# Patient Record
Sex: FEMALE | Race: BLACK OR AFRICAN AMERICAN | NOT HISPANIC OR LATINO
[De-identification: names, ages, dates, MRNs, and addresses within clinical notes are randomized per-mention and may not be internally consistent; named-entity substitution may affect disease eponyms.]

---

## 2020-03-24 ENCOUNTER — APPOINTMENT (OUTPATIENT)
Dept: BREAST CENTER | Facility: CLINIC | Age: 40
End: 2020-03-24
Payer: MEDICARE

## 2020-03-24 DIAGNOSIS — Z86.79 PERSONAL HISTORY OF OTHER DISEASES OF THE CIRCULATORY SYSTEM: ICD-10-CM

## 2020-03-24 DIAGNOSIS — Z87.39 PERSONAL HISTORY OF OTHER DISEASES OF THE MUSCULOSKELETAL SYSTEM AND CONNECTIVE TISSUE: ICD-10-CM

## 2020-03-24 DIAGNOSIS — Z78.9 OTHER SPECIFIED HEALTH STATUS: ICD-10-CM

## 2020-03-24 DIAGNOSIS — Z87.891 PERSONAL HISTORY OF NICOTINE DEPENDENCE: ICD-10-CM

## 2020-03-24 DIAGNOSIS — Z12.39 ENCOUNTER FOR OTHER SCREENING FOR MALIGNANT NEOPLASM OF BREAST: ICD-10-CM

## 2020-03-24 DIAGNOSIS — Z82.49 FAMILY HISTORY OF ISCHEMIC HEART DISEASE AND OTHER DISEASES OF THE CIRCULATORY SYSTEM: ICD-10-CM

## 2020-03-24 DIAGNOSIS — Z80.9 FAMILY HISTORY OF MALIGNANT NEOPLASM, UNSPECIFIED: ICD-10-CM

## 2020-03-24 DIAGNOSIS — Z83.511 FAMILY HISTORY OF GLAUCOMA: ICD-10-CM

## 2020-03-24 DIAGNOSIS — L29.9 PRURITUS, UNSPECIFIED: ICD-10-CM

## 2020-03-24 DIAGNOSIS — E72.12 METHYLENETETRAHYDROFOLATE REDUCTASE DEFICIENCY: ICD-10-CM

## 2020-03-24 PROBLEM — Z00.00 ENCOUNTER FOR PREVENTIVE HEALTH EXAMINATION: Status: ACTIVE | Noted: 2020-03-24

## 2020-03-24 PROCEDURE — 99204 OFFICE O/P NEW MOD 45 MIN: CPT

## 2020-03-24 RX ORDER — LABETALOL HYDROCHLORIDE 300 MG/1
TABLET, FILM COATED ORAL
Refills: 0 | Status: ACTIVE | COMMUNITY

## 2020-03-24 NOTE — HISTORY OF PRESENT ILLNESS
[FreeTextEntry1] : This is a 40 yr old female referred by Dr. Shine for discoloration of her breasts.  The patient states that she is s/p bilateral breast reduction 2014 by Dr. Tran.  She said she has some concerns since her reduction as she has been watching more "Botched" episodes. She is worried there is scar tissue that is affecting the "oxygenation of her tissues". She states her breast was very itchy when she was pregnant November-December 2019. She had a miscarriage at 6 weeks in December. She states the itchiness has subsided some but is not completely better. Denies any trauma, changes to herbs, diet or supplements.\par \par She does SBE. \par She has not noticed a change in her breast or a breast lump.\par She has not noticed a change in her nipple or nipple area.\par She has not noticed a change in the skin of the breast.\par She is not experiencing nipple discharge.\par She is not experiencing breast pain.\par She has not noticed a lump or lymph node under the armpit. \par \par BREAST CANCER RISK FACTORS\par Menarche: 11\par Date of LMP: Sept\par Menopause: pre\par Grav:   4   Para:  0\par Age at first live birth: n/a\par Nursed: n/a\par Hysterectomy: no\par Oophorectomy: no\par OCP: yes 2y\par HRT: no\par Last pap/pelvic exam: Jan WNL\par Related family history: no\par Ashkenazi: no\par Mastery risk assessment: BRCAPRO 11.1%, TCv6 11.1%, TCv7 14.6%, Dasha 9.6%\par BRCA testing: no\par Bra size: 40 DDD\par \par Last mammogram:  1/31/2020 right only                             Location: [facility], [facility]\par Report reviewed.                                 Images reviewed on PACS\par Results: are of possible architectural distortion right breast, rec 6 month right mammo\par \par Last ultrasound:  1/31/2020 right only                                       Location: [facility], [facility]\par Report reviewed.                                 Images reviewed on PACS\par Results: No solid or cystic masses are seen in area of interest.\par \par Last MRI: never                                             Location:\par Report reviewed.\par

## 2020-03-24 NOTE — CONSULT LETTER
[Dear  ___] : Dear ~ROBBIE, [( Thank you for referring [unfilled] for consultation for _____ )] : Thank you for referring [unfilled] for consultation for [unfilled] [Please see my note below.] : Please see my note below. [Consult Closing:] : Thank you very much for allowing me to participate in the care of this patient.  If you have any questions, please do not hesitate to contact me. [Sincerely,] : Sincerely, [FreeTextEntry3] : Deb Sloan MS DO\par Breast Surgeon\par [facility] [place] \[place] [address]\par

## 2020-03-24 NOTE — PHYSICAL EXAM
[Normocephalic] : normocephalic [Atraumatic] : atraumatic [Supple] : supple [No Supraclavicular Adenopathy] : no supraclavicular adenopathy [Examined in the supine and seated position] : examined in the supine and seated position [Symmetrical] : symmetrical [Grade 3] : Ptosis Grade 3 [No dominant masses] : no dominant masses in right breast  [No dominant masses] : no dominant masses left breast [No Nipple Retraction] : no left nipple retraction [No Nipple Discharge] : no left nipple discharge [No Axillary Lymphadenopathy] : no left axillary lymphadenopathy [No Edema] : no edema [No Rashes] : no rashes [No Ulceration] : no ulceration [de-identified] : s/p reduction wise pattern [de-identified] : in area of patient's concern, there is redness, no peau d'orange, no collections, no suspicious findings, likely eczematoid change

## 2020-03-24 NOTE — ASSESSMENT
[FreeTextEntry1] : 39 yo female with right breast imaging abnormality and itchy skin\par recommend derm cs asap, gave her names of local options\par reassured her that her breasts are viable and cosmetic outcome is excellent\par will have her imaging reviewed at [facility] biopsy vs July right mammogram\par I will call her after imaging reviewed\par reviewed her risk status she is not high risk\par  We reviewed risk reduction strategies including maintaining a BMI <25, limiting red meat intake and alcoholic beverages to 3 per week and exercise (150 min/ week low intensity or 75 min/week high intensity). And maintaining a normal vitamin D level.\par reviewed skin care, cotton bras, non-scented lotions and soaps, recommend moisturizing\par I reassured her that her skin changes are not suspicious for cancer\par plan right mammogram JULY 2020 and follow up with me afterward\par unless otherwise recommended by radiology\par She knows to call or return sooner should any concerns or questions arise.\par \par

## 2020-03-27 ENCOUNTER — APPOINTMENT (OUTPATIENT)
Dept: BREAST CENTER | Facility: CLINIC | Age: 40
End: 2020-03-27
Payer: MEDICARE

## 2020-03-27 DIAGNOSIS — R92.2 INCONCLUSIVE MAMMOGRAM: ICD-10-CM

## 2020-03-27 DIAGNOSIS — R92.8 OTHER ABNORMAL AND INCONCLUSIVE FINDINGS ON DIAGNOSTIC IMAGING OF BREAST: ICD-10-CM

## 2020-03-27 PROCEDURE — 99446 NTRPROF PH1/NTRNET/EHR 5-10: CPT

## 2020-05-29 PROBLEM — R92.2 DENSE BREAST: Status: ACTIVE | Noted: 2020-05-21

## 2020-05-29 PROBLEM — R92.8 ABNORMAL FINDING ON BREAST IMAGING: Status: ACTIVE | Noted: 2020-03-24

## 2021-01-14 ENCOUNTER — APPOINTMENT (OUTPATIENT)
Dept: POPULATION HEALTH | Facility: CLINIC | Age: 41
End: 2021-01-14
Payer: MEDICARE

## 2021-01-14 DIAGNOSIS — N97.9 FEMALE INFERTILITY, UNSPECIFIED: ICD-10-CM

## 2021-01-14 DIAGNOSIS — Z77.098 CONTACT WITH AND (SUSPECTED) EXPOSURE TO OTHER HAZARDOUS, CHIEFLY NONMEDICINAL, CHEMICALS: ICD-10-CM

## 2021-01-14 PROCEDURE — 99204 OFFICE O/P NEW MOD 45 MIN: CPT | Mod: 95

## 2021-01-14 RX ORDER — ALPRAZOLAM 2 MG/1
TABLET ORAL
Refills: 0 | Status: ACTIVE | COMMUNITY

## 2021-01-14 RX ORDER — OXYCODONE HYDROCHLORIDE 30 MG/1
TABLET ORAL
Refills: 0 | Status: ACTIVE | COMMUNITY

## 2021-01-14 NOTE — HISTORY OF PRESENT ILLNESS
[FreeTextEntry1] : 39 yo woman here for evaluation of exposure to PFAS chemicals in [facility].\par \par born in [place], lived there until 11\par lived in a house in the [place], drank tap.  went to school in [place].\par \par Moved to [place] [facility] from age 11 - 27 or 28\par for HS went to boarding school in [place]\par \par then went to [employer] in [facility]\par \par At 27 or 28 moved to [place].\par lived in 2 different homes in [place] [facility] a block apart.  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  office was in [place] and then [place].\par then she got injured at work and has been on disability since 2014.\par \par Hobbies:\par walks dog\par watch tv\par \par PMHx:\par \par secondary infertility:\par has had 3-4 pregnancies prior to moving to [place]; all were terminated electively\par after moving to [place] attempted pregnancy starting in ; \par went for evaluation and was diagnosed with fibroids; they were removed in  and .  \par had one pregnancy this year and miscarried at 6 weeks. \par \par depression\par anxiety\par \par thyroid cysts bur normal thyroid hormones.  \par \par \par \par FHx:\par father - healthy\par mother - healthy\par brother - drug abuse, \par \par \par \par

## 2021-01-14 NOTE — REASON FOR VISIT
[Home] : at home, [unfilled] , at the time of the visit. [Medical Office: ([facility])___] : at the medical office located in  [Verbal consent obtained from patient] : the patient, [unfilled]

## 2021-03-02 ENCOUNTER — APPOINTMENT (OUTPATIENT)
Dept: PLASTIC SURGERY | Facility: CLINIC | Age: 41
End: 2021-03-02
Payer: MEDICARE

## 2021-03-02 VITALS
DIASTOLIC BLOOD PRESSURE: 73 MMHG | OXYGEN SATURATION: 100 % | HEIGHT: 64 IN | BODY MASS INDEX: 35 KG/M2 | TEMPERATURE: 97.9 F | HEART RATE: 125 BPM | RESPIRATION RATE: 20 BRPM | SYSTOLIC BLOOD PRESSURE: 101 MMHG | WEIGHT: 205 LBS

## 2021-03-02 DIAGNOSIS — N62 HYPERTROPHY OF BREAST: ICD-10-CM

## 2021-03-02 DIAGNOSIS — Z86.59 PERSONAL HISTORY OF OTHER MENTAL AND BEHAVIORAL DISORDERS: ICD-10-CM

## 2021-03-02 DIAGNOSIS — Z87.2 PERSONAL HISTORY OF DISEASES OF THE SKIN AND SUBCUTANEOUS TISSUE: ICD-10-CM

## 2021-03-02 PROCEDURE — 99204 OFFICE O/P NEW MOD 45 MIN: CPT

## 2021-03-02 NOTE — ASSESSMENT
[FreeTextEntry1] : pt has MTHFR mutation and is on folate supplements .  she is going to try to lose weight and she has stopped nicotine products.  she will rto in the fall to discuss secondary  breast reduction and abdominoplasty surgery

## 2021-03-02 NOTE — HISTORY OF PRESENT ILLNESS
[FreeTextEntry1] :  Ms. KARTHIK ROE  is a   41 year  old female complaining of large recurrent  hypertrophic breasts after inferior pedicle breast reduction 2014, causing neck, back and shoulder strain.  She also complains of headache and fatigue from constantly trying to support her spine.  Her symptoms began shortly after puberty and remain a constant source  of discomfort and social embarrassment. Her symptoms are not relieved by postural changes weight reduction or supportive measures.  She has been under the care of other practitioners for relief which is ineffectual. The patient is a DDD cup and desires reduction to C_D cup.   The risks, benefits, alternatives limitations and the permanent scars were outlined with the patient and she is prepared for a pedicled  breast reduction with anchor    scar.\par Ms. KARTHIK ROE is a 41 year Other race woman complaining of skin and muscle laxity in the abdomen and lipodystrophy.  She  is Gravida3   Pregnancy 0   Abortion2 mis1    and is not planning any further children.  KARTHIK has gained significant weight  and now has residual pannus deformity and diastasis rectus. she also has intertrigo and has had open wounds on the crease below her pannus  The risks, benefits, alternatives, limitations, and the permanent scars were outlined with the patient and we have determined that abdominoplasty and diastasis rectus repair will be the best option for her .  All questions were answered.\par

## 2021-03-02 NOTE — REVIEW OF SYSTEMS
[Shoulder Problem] : shoulder problems [Lower Back Pain] : lower back pain [Mid Back Pain] : mid back pain [Upper Back Pain] : upper back pain [FreeTextEntry5] : htn

## 2021-07-01 ENCOUNTER — APPOINTMENT (OUTPATIENT)
Dept: PLASTIC SURGERY | Facility: CLINIC | Age: 41
End: 2021-07-01
Payer: MEDICARE

## 2021-07-01 DIAGNOSIS — M50.222 OTHER CERVICAL DISC DISPLACEMENT AT C5-C6 LEVEL: ICD-10-CM

## 2021-07-01 DIAGNOSIS — E88.1 LIPODYSTROPHY, NOT ELSEWHERE CLASSIFIED: ICD-10-CM

## 2021-07-01 PROCEDURE — 99213 OFFICE O/P EST LOW 20 MIN: CPT

## 2021-07-14 PROBLEM — E88.1 LIPODYSTROPHY: Status: ACTIVE | Noted: 2021-07-14

## 2021-07-14 NOTE — ASSESSMENT
[FreeTextEntry1] : pt is high risk for surgery given multiple medial issues and she needs to lose 30- 50 lbs prior to surgery to make herself a better candidate

## 2021-07-14 NOTE — HISTORY OF PRESENT ILLNESS
[FreeTextEntry1] : pt is here for second chat  she was here for abdominoplasty but was advised to lose weight first  she has =been unable to lose weight and desires to proceed.  pt is complicated and is not following a regimen that will lead to a successful outcome in addition she has mthfr which has higher risk of dvt pe and delayed healing . she desires surgery to remove hypertrophic scarring under her breasts and beneath her pannnus .  these are chronic infection sites from secondary healing or rashes

## 2021-07-14 NOTE — PHYSICAL EXAM
[NI] : Normal [de-identified] : s/p breast reduction no mass scar under breasts mildly hypertrophic no mass  [de-identified] : obese and pannus hangs over escutcheon  [de-identified] : scars as described along skin creases from chronic irritation

## 2022-06-21 ENCOUNTER — APPOINTMENT (OUTPATIENT)
Dept: PLASTIC SURGERY | Facility: CLINIC | Age: 42
End: 2022-06-21

## 2022-06-21 ENCOUNTER — APPOINTMENT (OUTPATIENT)
Dept: PLASTIC SURGERY | Facility: CLINIC | Age: 42
End: 2022-06-21
Payer: MEDICARE

## 2022-06-21 VITALS
BODY MASS INDEX: 32.61 KG/M2 | OXYGEN SATURATION: 96 % | HEART RATE: 101 BPM | WEIGHT: 190 LBS | DIASTOLIC BLOOD PRESSURE: 88 MMHG | SYSTOLIC BLOOD PRESSURE: 140 MMHG | RESPIRATION RATE: 20 BRPM

## 2022-06-21 PROCEDURE — 99213 OFFICE O/P EST LOW 20 MIN: CPT

## 2022-06-21 RX ORDER — AMLODIPINE BESYLATE 5 MG/1
5 TABLET ORAL
Qty: 90 | Refills: 0 | Status: ACTIVE | COMMUNITY
Start: 2022-04-13

## 2022-06-21 RX ORDER — ALPRAZOLAM 0.5 MG/1
0.5 TABLET ORAL
Qty: 60 | Refills: 0 | Status: ACTIVE | COMMUNITY
Start: 2022-05-31

## 2022-06-21 RX ORDER — TRAZODONE HYDROCHLORIDE 100 MG/1
100 TABLET ORAL
Qty: 30 | Refills: 0 | Status: ACTIVE | COMMUNITY
Start: 2022-05-31

## 2022-06-21 RX ORDER — SERTRALINE HYDROCHLORIDE 50 MG/1
50 TABLET, FILM COATED ORAL
Qty: 30 | Refills: 0 | Status: ACTIVE | COMMUNITY
Start: 2022-06-17

## 2022-06-21 RX ORDER — NYSTATIN AND TRIAMCINOLONE ACETONIDE 100000; 1 MG/G; MG/G
100000-0.1 CREAM TOPICAL
Qty: 60 | Refills: 0 | Status: ACTIVE | COMMUNITY
Start: 2022-01-12

## 2022-06-21 RX ORDER — HYDROCHLOROTHIAZIDE 25 MG/1
25 TABLET ORAL
Qty: 90 | Refills: 0 | Status: ACTIVE | COMMUNITY
Start: 2022-06-02

## 2022-06-21 NOTE — HISTORY OF PRESENT ILLNESS
[FreeTextEntry1] : pt has lost 25-30 lbs and has hit a plateau but she feels better physically and also brain fog is improved.  pt really needs to have insurance coverage for abdominoplasty because she has chronic dermatitis and rashes with intertrigo under the pannus The risks, benefits, alternatives, limitations and the permanent scars were outlined with the patient. All of KARTHIK 's questions and concerns were addressed and answered completely. KARTHIK is advised to lose more weight if possible to make her a better candidate for surgery  will need anticoagulation as she has mthfr gene mutation

## 2022-06-21 NOTE — ASSESSMENT
[FreeTextEntry1] : Ms. KARTHIK ROE is a 42 year Other race woman who has consulted with me regarding improving the contour of her  her abdomen to reduce the incidence of intertriginous rashes . KARTHIK  is a candidate for abdominoplasty and diastasis rectus repair.  The permanent scar along the lower abdomen from hip to hip and around the umbilicus, was demonstrated and explained.   There is no hernia present and some liposuction may be required along the lateral hip area to further improve the contour.  Drains will be used and then removed in one week.  The risks, benefits, alternatives, limitations, and the permanent scars were outlined with the patient and She will consider scheduling surgery under general anesthesia at a convenient time.  All questions were answered.

## 2022-06-21 NOTE — PHYSICAL EXAM
[NI] : Normal [de-identified] : s/p breast reduction no mass scar under breasts mildly hypertrophic no mass  [de-identified] : obese and pannus hangs over escutcheon  [de-identified] : scars as described along skin creases from chronic irritation

## 2022-11-08 ENCOUNTER — APPOINTMENT (OUTPATIENT)
Dept: PLASTIC SURGERY | Facility: CLINIC | Age: 42
End: 2022-11-08

## 2022-11-08 DIAGNOSIS — L90.5 SCAR CONDITIONS AND FIBROSIS OF SKIN: ICD-10-CM

## 2022-11-08 PROCEDURE — 99213 OFFICE O/P EST LOW 20 MIN: CPT

## 2022-11-08 NOTE — PHYSICAL EXAM
[NI] : Normal [de-identified] : s/p breast reduction no mass scar under breasts mildly hypertrophic no mass  [de-identified] : obese and pannus hangs over escutcheon  [de-identified] : scars as described along skin creases from chronic irritation

## 2022-11-08 NOTE — ASSESSMENT
[FreeTextEntry1] : Ms. KARTHIK ROE is a 42 year old black woman with MTHFR who has consulted with me regarding improving the contour of her  her abdomen. KARTHIK  is a candidate for abdominoplasty and diastasis rectus repair.  The permanent scar along the lower abdomen from hip to hip and around the umbilicus, was demonstrated and explained.   There is no hernia present and some liposuction may be required along the lateral hip area to further improve the contour.  Drains will be used and then removed in one week.  The risks, benefits, alternatives, limitations, and the permanent scars were outlined with the patient and She will consider scheduling surgery under general anesthesia at a convenient time.  All questions were answered.

## 2022-11-08 NOTE — HISTORY OF PRESENT ILLNESS
[FreeTextEntry1] : KARTHIK is here for preop discussion regarding approved abdominal skin surgery The risks, benefits, alternatives, limitations and the permanent scars were outlined with the patient. .limitations stressed as pt is  lb overweight and will still have lipodystrophy after abdominoplasty .  She will require further wt loss and exercise to improve contour further .  All of KARTHIK 's questions and concerns were addressed and answered completely The instructions were reviewed in detail with AKRTHIK.

## 2022-12-12 ENCOUNTER — APPOINTMENT (OUTPATIENT)
Dept: PLASTIC SURGERY | Facility: HOSPITAL | Age: 42
End: 2022-12-12

## 2022-12-12 PROCEDURE — 15830 EXC EXCESSIVE SKIN ABDOMEN: CPT

## 2022-12-14 ENCOUNTER — APPOINTMENT (OUTPATIENT)
Dept: PLASTIC SURGERY | Facility: HOSPITAL | Age: 42
End: 2022-12-14

## 2022-12-16 ENCOUNTER — APPOINTMENT (OUTPATIENT)
Dept: PLASTIC SURGERY | Facility: CLINIC | Age: 42
End: 2022-12-16

## 2022-12-16 VITALS
OXYGEN SATURATION: 100 % | HEART RATE: 90 BPM | DIASTOLIC BLOOD PRESSURE: 80 MMHG | TEMPERATURE: 98.3 F | RESPIRATION RATE: 2 BRPM | SYSTOLIC BLOOD PRESSURE: 122 MMHG

## 2022-12-16 PROCEDURE — 99024 POSTOP FOLLOW-UP VISIT: CPT

## 2022-12-19 ENCOUNTER — APPOINTMENT (OUTPATIENT)
Dept: PLASTIC SURGERY | Facility: CLINIC | Age: 42
End: 2022-12-19

## 2022-12-19 VITALS — RESPIRATION RATE: 20 BRPM | TEMPERATURE: 97.8 F | HEART RATE: 89 BPM | OXYGEN SATURATION: 100 %

## 2022-12-19 PROCEDURE — 99024 POSTOP FOLLOW-UP VISIT: CPT

## 2022-12-19 RX ORDER — OXYCODONE AND ACETAMINOPHEN 5; 325 MG/1; MG/1
5-325 TABLET ORAL
Qty: 40 | Refills: 0 | Status: ACTIVE | COMMUNITY
Start: 2022-12-12

## 2022-12-19 RX ORDER — CEPHALEXIN 500 MG/1
500 CAPSULE ORAL
Qty: 20 | Refills: 0 | Status: ACTIVE | COMMUNITY
Start: 2022-12-12

## 2022-12-19 NOTE — ASSESSMENT
[FreeTextEntry1] : All of KARTHIK's incisions are clean dry and healing well.  Her  sutures were removed and areas steri stripped.  drains to remain The instructions were reviewed in detail with KARTHIK. KARTHIK will return to the office for a post procedure visit 2 d

## 2022-12-19 NOTE — ASSESSMENT
[FreeTextEntry1] : KARTHIK ANGELES will return to the office for a post procedure visit next week for drain and suture removal . The instructions were reviewed in detail with KARTHIK. All of KARTHIK 's questions and concerns were addressed and answered completely

## 2022-12-19 NOTE — HISTORY OF PRESENT ILLNESS
[FreeTextEntry1] : KARTHIK concerned re swelling and lack of drainage  went to emergency room and had neg exam and sent home feels well.   KARTHIK with vss sat 100 % ra air The patient denies fever,chills, shortness of breath, chest pain, calf pain KARTHIK  has had uncomplicated recovery from surgery and anesthesia All of KARTHIK's incisions are clean dry and healing well.  Her  sutures were removed and areas steri stripped.  drainage still too high for removal  The instructions were reviewed in detail with KARTHIK. KARTHIK will return to the office for a post procedure visit 2 d

## 2022-12-19 NOTE — HISTORY OF PRESENT ILLNESS
[FreeTextEntry1] : KARTHIK is doing well after abdominoplasty The patient denies fever,chills, shortness of breath, chest pain, calf pain KARTHIK  has had uncomplicated recovery from surgery and anesthesia .drainage is too high

## 2022-12-20 ENCOUNTER — APPOINTMENT (OUTPATIENT)
Dept: PLASTIC SURGERY | Facility: CLINIC | Age: 42
End: 2022-12-20

## 2022-12-22 ENCOUNTER — APPOINTMENT (OUTPATIENT)
Dept: PLASTIC SURGERY | Facility: CLINIC | Age: 42
End: 2022-12-22

## 2022-12-22 VITALS
RESPIRATION RATE: 20 BRPM | SYSTOLIC BLOOD PRESSURE: 130 MMHG | DIASTOLIC BLOOD PRESSURE: 87 MMHG | TEMPERATURE: 97.4 F | HEART RATE: 100 BPM | OXYGEN SATURATION: 100 %

## 2022-12-22 PROCEDURE — 99024 POSTOP FOLLOW-UP VISIT: CPT

## 2022-12-23 NOTE — HISTORY OF PRESENT ILLNESS
[FreeTextEntry1] : KARTHIK is doing well drainage is too high for removal  The instructions were reviewed in detail with KARTHIK. All of KARTHIK 's questions and concerns were addressed and answered completely KARTHIK will return to the office for a post procedure visit tues

## 2022-12-23 NOTE — ASSESSMENT
[FreeTextEntry1] : all wounds looks fine  drain sites are healthy no erythema fluid in drains is clear luisito with no fibrinous exudate or cloudiness . will keep KARTHIK off antibiotics for now KARTHIK will return to the office for a post procedure visit tuesday

## 2022-12-27 ENCOUNTER — APPOINTMENT (OUTPATIENT)
Dept: PLASTIC SURGERY | Facility: CLINIC | Age: 42
End: 2022-12-27
Payer: MEDICARE

## 2022-12-27 VITALS
RESPIRATION RATE: 22 BRPM | SYSTOLIC BLOOD PRESSURE: 123 MMHG | TEMPERATURE: 98.7 F | HEART RATE: 91 BPM | DIASTOLIC BLOOD PRESSURE: 80 MMHG | OXYGEN SATURATION: 100 %

## 2022-12-27 PROCEDURE — 99024 POSTOP FOLLOW-UP VISIT: CPT

## 2023-01-03 ENCOUNTER — APPOINTMENT (OUTPATIENT)
Dept: PLASTIC SURGERY | Facility: CLINIC | Age: 43
End: 2023-01-03
Payer: MEDICARE

## 2023-01-03 VITALS
OXYGEN SATURATION: 100 % | TEMPERATURE: 98 F | SYSTOLIC BLOOD PRESSURE: 155 MMHG | HEART RATE: 123 BPM | DIASTOLIC BLOOD PRESSURE: 75 MMHG

## 2023-01-03 PROCEDURE — 99024 POSTOP FOLLOW-UP VISIT: CPT

## 2023-01-09 NOTE — HISTORY OF PRESENT ILLNESS
[FreeTextEntry1] : KARTHIK's drainage finally has abated both drains 10 cc /24 hr period  sl clean and dry  The patient denies fever,chills, shortness of breath, chest pain, calf pain KARTHIK  has had uncomplicated recovery from surgery and anesthesia

## 2023-01-09 NOTE — ASSESSMENT
[FreeTextEntry1] : KARTHIK is very happy with outcome of surgery KARTHIK  has had uncomplicated recovery from surgery and anesthesia The instructions were reviewed in detail with KARTHIK. All of KARTHIK 's questions and concerns were addressed and answered completely KARTHIK will return to the office for a post procedure visit

## 2023-01-17 ENCOUNTER — APPOINTMENT (OUTPATIENT)
Dept: PLASTIC SURGERY | Facility: CLINIC | Age: 43
End: 2023-01-17
Payer: MEDICARE

## 2023-01-17 VITALS
HEART RATE: 109 BPM | OXYGEN SATURATION: 100 % | DIASTOLIC BLOOD PRESSURE: 74 MMHG | TEMPERATURE: 97.9 F | SYSTOLIC BLOOD PRESSURE: 134 MMHG | RESPIRATION RATE: 18 BRPM

## 2023-01-17 PROCEDURE — 99024 POSTOP FOLLOW-UP VISIT: CPT

## 2023-01-23 NOTE — ASSESSMENT
[FreeTextEntry1] : KARTHIK  is now 15 days after abdominoplasty and drainage is finally abating   All of KARTHIK 's questions and concerns were addressed and answered completely The instructions were reviewed in detail with KARTHIK. drains removed

## 2023-01-23 NOTE — HISTORY OF PRESENT ILLNESS
[FreeTextEntry1] : KARTHIK is doing very well  drainage has abated and she feels very well and is happy with appearance and recovery

## 2023-02-15 NOTE — ASSESSMENT
[FreeTextEntry1] : KARTHIK is doing well  we discussed further surgeries such as liposuction of the trunk. The risks, benefits, alternatives, limitations and the permanent scars were outlined with the patient. All of KARTHIK 's questions and concerns were addressed and answered completely The instructions were reviewed in detail with KARTHIK.

## 2023-02-15 NOTE — HISTORY OF PRESENT ILLNESS
[FreeTextEntry1] : KARTHIK is returning because she is concerned that she has seroma abdomen . there is no evidence of seroma .  excellent appearance and uncomplicated recovery .  scar is well healed

## 2023-03-16 ENCOUNTER — APPOINTMENT (OUTPATIENT)
Dept: PLASTIC SURGERY | Facility: CLINIC | Age: 43
End: 2023-03-16
Payer: MEDICARE

## 2023-03-16 VITALS
TEMPERATURE: 97.7 F | HEART RATE: 66 BPM | DIASTOLIC BLOOD PRESSURE: 89 MMHG | OXYGEN SATURATION: 98 % | SYSTOLIC BLOOD PRESSURE: 155 MMHG

## 2023-03-16 PROCEDURE — 99214 OFFICE O/P EST MOD 30 MIN: CPT

## 2023-03-17 RX ORDER — CEPHALEXIN 500 MG/1
500 TABLET ORAL
Qty: 20 | Refills: 0 | Status: ACTIVE | COMMUNITY
Start: 2023-03-17 | End: 1900-01-01

## 2023-03-17 NOTE — ASSESSMENT
[FreeTextEntry1] : KARTHIK has mass that may represent a cyst or an area of organized fat necrosis  which needs top be removed as it is large and tender The risks, benefits, alternatives, limitations and the permanent scars were outlined with the patient. All of KARTHIK 's questions and concerns were addressed and answered completely The instructions were reviewed in detail with KARTHIK. KARTHIK prepared for office surgery with excision and repair

## 2023-03-17 NOTE — HISTORY OF PRESENT ILLNESS
[FreeTextEntry1] : KARTHIK has a tender firm mass in the lower abdomen above the existing scar from pannus resection.  KARTHIK states that it is larger than before and hurts especially with waist band KARTHIK desires removal of the mass The risks, benefits, alternatives, limitations and the permanent scars were outlined with the patient. All of KARTHIK 's questions and concerns were addressed and answered completely The instructions were reviewed in detail with KARTHIK.

## 2023-03-17 NOTE — PHYSICAL EXAM
[NI] : Normal [de-identified] : healed panniculectomy scar with 6 x 3 cm firm tender mass no erythema or drainage or warmth central 1/3  lower abdomen

## 2023-03-24 ENCOUNTER — APPOINTMENT (OUTPATIENT)
Dept: PLASTIC SURGERY | Facility: CLINIC | Age: 43
End: 2023-03-24
Payer: MEDICARE

## 2023-03-24 VITALS
HEART RATE: 80 BPM | WEIGHT: 204.25 LBS | SYSTOLIC BLOOD PRESSURE: 144 MMHG | DIASTOLIC BLOOD PRESSURE: 98 MMHG | RESPIRATION RATE: 20 BRPM | OXYGEN SATURATION: 100 % | BODY MASS INDEX: 35.06 KG/M2

## 2023-03-24 PROCEDURE — 99214 OFFICE O/P EST MOD 30 MIN: CPT

## 2023-03-27 NOTE — ASSESSMENT
[FreeTextEntry1] : KARTHIK is prepared for  excision of the r lower abdominal mass that does not seem to be infectious in nature but perhaps is fat necrosis and also she is going to have a revision of the lateral scars bilaterally and will have some contour reduction as well.

## 2023-03-27 NOTE — HISTORY OF PRESENT ILLNESS
[FreeTextEntry1] : KARTHIK is scheduled for excision of the lower abdominal sq mass and is complaining also of irregularity of the scarring laterally bilaterally after panniculectomy  she desires revision of the area at the same time The risks, benefits, alternatives, limitations and the permanent scars were outlined with the patient. All of KARTHIK 's questions and concerns were addressed and answered completely The instructions were reviewed in detail with KARTHIK.

## 2023-04-07 ENCOUNTER — APPOINTMENT (OUTPATIENT)
Dept: PLASTIC SURGERY | Facility: CLINIC | Age: 43
End: 2023-04-07
Payer: MEDICARE

## 2023-04-07 VITALS
OXYGEN SATURATION: 100 % | DIASTOLIC BLOOD PRESSURE: 84 MMHG | SYSTOLIC BLOOD PRESSURE: 141 MMHG | HEART RATE: 87 BPM | RESPIRATION RATE: 20 BRPM

## 2023-04-07 PROCEDURE — 99213 OFFICE O/P EST LOW 20 MIN: CPT

## 2023-04-13 DIAGNOSIS — L98.7 EXCESSIVE AND REDUNDANT SKIN AND SUBCUTANEOUS TISSUE: ICD-10-CM

## 2023-04-13 RX ORDER — CEPHALEXIN 500 MG/1
500 TABLET ORAL
Qty: 20 | Refills: 0 | Status: ACTIVE | COMMUNITY
Start: 2023-04-13 | End: 1900-01-01

## 2023-04-13 RX ORDER — DIAZEPAM 5 MG/1
5 TABLET ORAL
Qty: 2 | Refills: 0 | Status: ACTIVE | COMMUNITY
Start: 2023-04-13 | End: 1900-01-01

## 2023-04-13 RX ORDER — OXYCODONE AND ACETAMINOPHEN 5; 325 MG/1; MG/1
5-325 TABLET ORAL
Qty: 20 | Refills: 0 | Status: ACTIVE | COMMUNITY
Start: 2023-04-13 | End: 1900-01-01

## 2023-04-17 ENCOUNTER — APPOINTMENT (OUTPATIENT)
Dept: PLASTIC SURGERY | Facility: CLINIC | Age: 43
End: 2023-04-17
Payer: MEDICARE

## 2023-04-17 ENCOUNTER — RESULT REVIEW (OUTPATIENT)
Age: 43
End: 2023-04-17

## 2023-04-17 VITALS
SYSTOLIC BLOOD PRESSURE: 122 MMHG | TEMPERATURE: 97.4 F | OXYGEN SATURATION: 99 % | RESPIRATION RATE: 20 BRPM | DIASTOLIC BLOOD PRESSURE: 83 MMHG | HEART RATE: 103 BPM

## 2023-04-17 VITALS — DIASTOLIC BLOOD PRESSURE: 91 MMHG | HEART RATE: 82 BPM | OXYGEN SATURATION: 99 % | SYSTOLIC BLOOD PRESSURE: 127 MMHG

## 2023-04-17 VITALS — OXYGEN SATURATION: 99 % | DIASTOLIC BLOOD PRESSURE: 84 MMHG | SYSTOLIC BLOOD PRESSURE: 132 MMHG | HEART RATE: 73 BPM

## 2023-04-17 VITALS — SYSTOLIC BLOOD PRESSURE: 129 MMHG | DIASTOLIC BLOOD PRESSURE: 87 MMHG | HEART RATE: 77 BPM

## 2023-04-17 PROCEDURE — 14000 TIS TRNFR TRUNK 10 SQ CM/<: CPT

## 2023-04-17 NOTE — ASSESSMENT
[FreeTextEntry1] : KARTHIK tolerated the procedure well. KARTHIK  has had uncomplicated recovery from surgery and anesthesia The instructions were reviewed in detail with KARTHIK. All of KARTHIK 's questions and concerns were addressed and answered completely KARTHIK will return to the office for a post procedure visit friday

## 2023-04-17 NOTE — PROCEDURE
[Nl] : None [FreeTextEntry1] : abdominal wall mass [FreeTextEntry2] : excision abdominal wall mass  [FreeTextEntry6] : KARTHIK ORE 43 year b F with a mass in the area of abdominal scar s/p panniculectomy.  She  is here for excisional biopsy.  The risks benefits alternatives limitations and the permanent scars were outlined with KARTHIK.  All questions were answered.  Under aseptic conditions  po antibiotic valium 5 mg and percocet 5 /325 x 1 and local anesthetic, the lesion was excised with a 15 blade and undermining was performed for a tension free closure.  Hemostasis was obtained with electrocautery and the wound was repaired in layers  with vicryl monocryl and nylon sutures.  She tolerated the procedure well and was given a sterile occlusive dressing and instructions were reviewed.  The pathology specimen was placed in formalin and submitted for permanent section pathology   and it had the appearance and consistency of fat necrosis .  In addition to the mass excision,  the dog ears were excised and the hips and abdomen were liposuctioned A follow up appointment was given for suture removal and follow up.

## 2023-04-19 NOTE — HISTORY OF PRESENT ILLNESS
[FreeTextEntry1] : KARTHIK has mass along scar border r lower abdomen.  this is likely fat necrosis from the abdominal panniculectomy. The risks, benefits, alternatives, limitations and the permanent scars were outlined with the patient. KARTHIK is concerned that she will not be able to do the excision in the office under local anesthetic.  KARTHIK is reassured and is prepared for excision and repair  as well as revision of the lower abdomen and hip lipodystrophy present

## 2023-04-19 NOTE — PHYSICAL EXAM
[NI] : Normal [de-identified] : healed panniculectomy scar with 6 x 3 cm firm tender mass no erythema or drainage or warmth central 1/3  lower abdomen lateral there are pointed hips that are contoured

## 2023-04-19 NOTE — ASSESSMENT
[FreeTextEntry1] : KARTHIK is prepared for excision and tissue advancement for the mass in the lower abdomen  she is also prepared for revision of the previous surgery with sal of the hips and abdomen The risks, benefits, alternatives, limitations and the permanent scars were outlined with the patient. .

## 2023-04-21 ENCOUNTER — APPOINTMENT (OUTPATIENT)
Dept: PLASTIC SURGERY | Facility: CLINIC | Age: 43
End: 2023-04-21
Payer: SELF-PAY

## 2023-04-21 VITALS
DIASTOLIC BLOOD PRESSURE: 92 MMHG | RESPIRATION RATE: 24 BRPM | OXYGEN SATURATION: 97 % | HEART RATE: 85 BPM | TEMPERATURE: 97.1 F | SYSTOLIC BLOOD PRESSURE: 137 MMHG

## 2023-04-21 PROCEDURE — 99024 POSTOP FOLLOW-UP VISIT: CPT

## 2023-05-04 ENCOUNTER — NON-APPOINTMENT (OUTPATIENT)
Age: 43
End: 2023-05-04

## 2023-05-08 NOTE — REVIEW OF SYSTEMS
05/08/23 0437   Post-Acute Status   Post-Acute Authorization Other   Coverage Medicare Part A/BCBS   Other Status No Post-Acute Service Needs   Discharge Delays None known at this time   Discharge Plan   Discharge Plan A Home;Home Health   Discharge Plan B Rehab     Patient may need rehab or outpatient PT follow ups. Patient would like more information on insurance options.     Patient would like to return home if applicable.     ISAIAS Whitley, MSW-LMSW  Medical Social Worker/  ER Department      [Skin Lesions] : skin lesion [de-identified] : subcutaneous mass abdomen

## 2023-05-15 NOTE — ASSESSMENT
[FreeTextEntry1] : KARTHIK is doing very well The instructions were reviewed in detail with KARTHIK. KARTHIK will return to the office for a post procedure visit 3 weeks

## 2023-05-15 NOTE — HISTORY OF PRESENT ILLNESS
[FreeTextEntry1] : KARTHIK is doing well after office revision and excision of fat necrosis mass r lower abdomen . The patient denies fever,chills, shortness of breath, chest pain, calf pain KARTHIK  has had uncomplicated recovery from surgery and anesthesia KARTHIK is happy with appearance

## 2023-05-26 ENCOUNTER — APPOINTMENT (OUTPATIENT)
Dept: PLASTIC SURGERY | Facility: CLINIC | Age: 43
End: 2023-05-26

## 2023-05-26 ENCOUNTER — APPOINTMENT (OUTPATIENT)
Dept: PLASTIC SURGERY | Facility: CLINIC | Age: 43
End: 2023-05-26
Payer: MEDICARE

## 2023-05-26 VITALS — DIASTOLIC BLOOD PRESSURE: 87 MMHG | HEART RATE: 114 BPM | SYSTOLIC BLOOD PRESSURE: 122 MMHG | OXYGEN SATURATION: 100 %

## 2023-05-26 DIAGNOSIS — R19.8 OTHER SPECIFIED SYMPTOMS AND SIGNS INVOLVING THE DIGESTIVE SYSTEM AND ABDOMEN: ICD-10-CM

## 2023-05-26 PROCEDURE — 99024 POSTOP FOLLOW-UP VISIT: CPT

## 2023-05-26 NOTE — ASSESSMENT
[FreeTextEntry1] : KARTHIK has a beautiful post operative result and is happy  KARTHIK will return to the office for a post procedure visit prn photos taken and releases obtained

## 2023-05-26 NOTE — HISTORY OF PRESENT ILLNESS
[FreeTextEntry1] : excellent post operative appearance  good shape symmetry and contour KARTHIK  has had uncomplicated recovery from surgery and anesthesia The patient denies fever,chills, shortness of breath, chest pain, calf pain

## 2023-06-02 ENCOUNTER — APPOINTMENT (OUTPATIENT)
Dept: PLASTIC SURGERY | Facility: CLINIC | Age: 43
End: 2023-06-02

## 2023-07-06 ENCOUNTER — APPOINTMENT (OUTPATIENT)
Dept: PLASTIC SURGERY | Facility: CLINIC | Age: 43
End: 2023-07-06

## 2023-07-07 ENCOUNTER — APPOINTMENT (OUTPATIENT)
Dept: PLASTIC SURGERY | Facility: CLINIC | Age: 43
End: 2023-07-07
Payer: MEDICARE

## 2023-07-07 VITALS — DIASTOLIC BLOOD PRESSURE: 89 MMHG | SYSTOLIC BLOOD PRESSURE: 132 MMHG | HEART RATE: 82 BPM | OXYGEN SATURATION: 100 %

## 2023-07-07 DIAGNOSIS — R19.03 RIGHT LOWER QUADRANT ABDOMINAL SWELLING, MASS AND LUMP: ICD-10-CM

## 2023-07-07 PROCEDURE — 99213 OFFICE O/P EST LOW 20 MIN: CPT

## 2023-07-07 NOTE — ASSESSMENT
[FreeTextEntry1] : KARTHIK has a beautiful result and has much improved contour.  She did have fat necrosis and this area was excised and cleared to a large extent of the hard mass that was present KARTHIK understands that further removal would have left a severe contour deformity and I am not supportive of going back to take out more tissue.  This made her stand up and walk out without a word spoken

## 2023-07-07 NOTE — HISTORY OF PRESENT ILLNESS
[FreeTextEntry1] : KARTHIK is s/p abdominoplasty with insurance and was unhappy with lipodystrophy and had liposuction performed for no charge. KARTHIK was very appreciative and excited to have had such a dramatic improvement   KARTHIK also had some fat necrosis which was excised from the right lower quadrant in the office . The specimen was benign and removal was accomplished with no deformity to the underlying contour.  KARTHIK presented today saying that her massage therapist insists that there is some thing under the skin that is not supposed to be there.  I have told her that the excision removed the bulk of the fat necrosis but what remains is not amenable to removal without significant excision and resulting worse contour. I informed her that I was not going to do any further surgery at this time and she abruptly got up got dressed and left without another word.

## 2023-07-07 NOTE — PHYSICAL EXAM
[NI] : Normal [de-identified] : excellent contour improvement mild firmness along the scarring no deformity no opening or drainage non tender to palpation